# Patient Record
Sex: FEMALE
[De-identification: names, ages, dates, MRNs, and addresses within clinical notes are randomized per-mention and may not be internally consistent; named-entity substitution may affect disease eponyms.]

---

## 2023-05-17 ENCOUNTER — HOSPITAL ENCOUNTER (OUTPATIENT)
Dept: HOSPITAL 95 - ORSCSDS | Age: 43
Discharge: HOME | End: 2023-05-17
Attending: OTOLARYNGOLOGY
Payer: COMMERCIAL

## 2023-05-17 VITALS — DIASTOLIC BLOOD PRESSURE: 66 MMHG | SYSTOLIC BLOOD PRESSURE: 130 MMHG

## 2023-05-17 VITALS — BODY MASS INDEX: 29.72 KG/M2 | HEIGHT: 61 IN | WEIGHT: 157.41 LBS

## 2023-05-17 DIAGNOSIS — R49.0: Primary | ICD-10-CM

## 2023-05-17 DIAGNOSIS — J38.1: ICD-10-CM

## 2023-05-17 PROCEDURE — 0CDV8ZZ EXTRACTION OF LEFT VOCAL CORD, VIA NATURAL OR ARTIFICIAL OPENING ENDOSCOPIC: ICD-10-PCS | Performed by: OTOLARYNGOLOGY

## 2023-05-17 NOTE — NUR
05/17/23 Klarissa9 Shirley Hoover
 LINE USED WITH PATIENT AND  IN ROOM WITH DR QURESHI,
PATIENT VERBALIZED UNDERSTANDING.

## 2023-05-17 NOTE — NUR
05/17/23 1155 DELANO MENDOZA, STUDENT NURSE ASSISTING WITH CARE. PATIENT IDENTIFIED
A 6/10 FOR PAIN USING A VISUAL PAIN SCALE. PATIENT IS SITTING UP IN
THE CHAIR, TOLERATING WATER AND POPSCICLE.

## 2023-05-17 NOTE — NUR
05/17/23 1102 Allyson Reveles
10ML OF EPI 1MG/1ML ON THE FIELD USED TO SOAK PLEDGETS.
 
 
9ML OF NORMAL SALINE MIXED AND VERIFIED WITH 1 ML OF OF EPI 1:1,000
(1MG/ML) TO MAKE EPI 1:10,000 FOR INJECTION AT THE OPSITE BY DR QURESHI.
 
 
WET TOWELS PLACED ON PATIENTS FACE PRIOR TO USE OF CO2 LASER.